# Patient Record
Sex: FEMALE | HISPANIC OR LATINO | Employment: FULL TIME | URBAN - METROPOLITAN AREA
[De-identification: names, ages, dates, MRNs, and addresses within clinical notes are randomized per-mention and may not be internally consistent; named-entity substitution may affect disease eponyms.]

---

## 2022-05-10 ENCOUNTER — OFFICE VISIT (OUTPATIENT)
Dept: PODIATRY | Facility: CLINIC | Age: 47
End: 2022-05-10
Payer: COMMERCIAL

## 2022-05-10 VITALS — WEIGHT: 165 LBS | HEIGHT: 60 IN | HEART RATE: 81 BPM | BODY MASS INDEX: 32.39 KG/M2 | OXYGEN SATURATION: 98 %

## 2022-05-10 DIAGNOSIS — L60.0 INGROWN TOENAIL: Primary | ICD-10-CM

## 2022-05-10 PROCEDURE — 11750 EXCISION NAIL&NAIL MATRIX: CPT | Mod: T5 | Performed by: PODIATRIST

## 2022-05-10 PROCEDURE — 99203 OFFICE O/P NEW LOW 30 MIN: CPT | Mod: 25 | Performed by: PODIATRIST

## 2022-05-10 RX ORDER — LIDOCAINE HYDROCHLORIDE 20 MG/ML
5 INJECTION, SOLUTION INFILTRATION; PERINEURAL ONCE
Status: COMPLETED | OUTPATIENT
Start: 2022-05-10 | End: 2022-05-10

## 2022-05-10 RX ORDER — DOXYCYCLINE 100 MG/1
100 TABLET ORAL DAILY
COMMUNITY

## 2022-05-10 RX ADMIN — LIDOCAINE HYDROCHLORIDE 5 ML: 20 INJECTION, SOLUTION INFILTRATION; PERINEURAL at 15:34

## 2022-05-10 ASSESSMENT — PAIN SCALES - GENERAL: PAINLEVEL: SEVERE PAIN (7)

## 2022-05-10 NOTE — LETTER
5/10/2022         RE: Opal Barbour  [address]  [place] [address]        Dear Colleague,    Thank you for referring your patient, Opal Barbour, to the [facility]. Please see a copy of my visit note below.    FOOT AND ANKLE SURGERY/PODIATRY CONSULT NOTE         ASSESSMENT:   Ingrown toenail right great toe      TREATMENT:  Performed partial nail excision and matrixectomy of the lateral border of the right great toenail today under local anesthesia of 2% lidocaine plain via the phenol and alcohol technique.  The patient tolerated the procedure and anesthesia well and was discharged in good condition.  She was given both written and verbal postoperative instructions.  She is to return to the clinic as needed.        HPI:Opal Barbour presented to the clinic today complaining of painful ingrown toenail involving her right great toe.  The patient indicated she has had a problem with this toenail for several weeks.  It is quite painful and aggravated by her shoe gear.  She has to stand and wear enclosed shoes while at work and this severely aggravates the toenail.  She denies any trauma to the toe.  She does have some redness and swelling and some mild drainage.  There are no factors which relieve her pain.  She denies any other previous treatment.    No past medical history on file.    Social History     Socioeconomic History     Marital status:      Spouse name: Not on file     Number of children: Not on file     Years of education: Not on file     Highest education level: Not on file   Occupational History     Not on file   Tobacco Use     Smoking status: Not on file     Smokeless tobacco: Not on file   Substance and Sexual Activity     Alcohol use: Not on file     Drug use: Not on file     Sexual activity: Not on file   Other Topics Concern     Not on file   Social History Narrative     Not on file     Social Determinants of Health      Financial Resource Strain: Not on file   Food Insecurity: Not on file   Transportation Needs: Not on file   Physical Activity: Not on file   Stress: Not on file   Social Connections: Not on file   Intimate Partner Violence: Not on file   Housing Stability: Not on file          Allergies   Allergen Reactions     Penicillins Hives        No current outpatient medications on file.     No family history on file.     Social History     Socioeconomic History     Marital status:      Spouse name: Not on file     Number of children: Not on file     Years of education: Not on file     Highest education level: Not on file   Occupational History     Not on file   Tobacco Use     Smoking status: Not on file     Smokeless tobacco: Not on file   Substance and Sexual Activity     Alcohol use: Not on file     Drug use: Not on file     Sexual activity: Not on file   Other Topics Concern     Not on file   Social History Narrative     Not on file     Social Determinants of Health     Financial Resource Strain: Not on file   Food Insecurity: Not on file   Transportation Needs: Not on file   Physical Activity: Not on file   Stress: Not on file   Social Connections: Not on file   Intimate Partner Violence: Not on file   Housing Stability: Not on file        Review of Systems - Patient denies fever, chills, rash, wound, stiffness, limping, numbness, weakness, heart burn, blood in stool, chest pain with activity, calf pain when walking, shortness of breath with activity, chronic cough, easy bleeding/bruising, swelling of ankles, excessive thirst, fatigue, depression, anxiety.  Patient admits to painful ingrown toenail right great toe.      OBJECTIVE:  Appearance: alert, well appearing, and in no distress.    There were no vitals taken for this visit.     There is no height or weight on file to calculate BMI.     General appearance: Patient is alert and fully cooperative with history & exam.  No sign of distress is noted during  the visit.  Psychiatric: Affect is pleasant & appropriate.  Patient appears motivated to improve health.  Respiratory: Breathing is regular & unlabored while sitting.  HEENT: Hearing is intact to spoken word.  Speech is clear.  No gross evidence of visual impairment that would impact ambulation.    Vascular: Dorsalis pedis and posterior tibial pulses are palpable. There is no pedal hair growth bilaterally.  CFT < 3 sec from anterior tibial surface to distal digits bilaterally. There is no appreciable edema noted.  Dermatologic: The lateral borde of the right great toenail is severely incurvated.  There is moderate edema and erythema along the lateral margin of the right great toenail.  Turgor and texture are within normal limits. No coloration or temperature changes. No primary or secondary lesions noted.  Neurologic: All epicritic and proprioceptive sensations are grossly intact bilaterally.  Musculoskeletal: All active and passive ankle, subtalar, midtarsal, and 1st MPJ range of motion are grossly intact without pain or crepitus, with the exception of none. Manual muscle strength is within normal limits bilaterally. All dorsiflexors, plantarflexors, invertors, evertors are intact bilaterally. Tenderness present to the lateral margin of the right great toenail on palpation.  No tenderness to the right great toe with range of motion. Calf is soft/non-tender without warmth/induration    Imaging:       No images are attached to the encounter or orders placed in the encounter.     No results found.   No results found.       Sanjay Cool DPM  [facility] Foot & Ankle Surgery/Podiatry         Again, thank you for allowing me to participate in the care of your patient.        Sincerely,        Sanjay Lewis DPM

## 2022-05-10 NOTE — PROGRESS NOTES
FOOT AND ANKLE SURGERY/PODIATRY CONSULT NOTE         ASSESSMENT:   Ingrown toenail right great toe      TREATMENT:  Performed partial nail excision and matrixectomy of the lateral border of the right great toenail today under local anesthesia of 2% lidocaine plain via the phenol and alcohol technique.  The patient tolerated the procedure and anesthesia well and was discharged in good condition.  She was given both written and verbal postoperative instructions.  She is to return to the clinic as needed.        HPI:Opal Barbour presented to the clinic today complaining of painful ingrown toenail involving her right great toe.  The patient indicated she has had a problem with this toenail for several weeks.  It is quite painful and aggravated by her shoe gear.  She has to stand and wear enclosed shoes while at work and this severely aggravates the toenail.  She denies any trauma to the toe.  She does have some redness and swelling and some mild drainage.  There are no factors which relieve her pain.  She denies any other previous treatment.    No past medical history on file.    Social History     Socioeconomic History     Marital status:      Spouse name: Not on file     Number of children: Not on file     Years of education: Not on file     Highest education level: Not on file   Occupational History     Not on file   Tobacco Use     Smoking status: Not on file     Smokeless tobacco: Not on file   Substance and Sexual Activity     Alcohol use: Not on file     Drug use: Not on file     Sexual activity: Not on file   Other Topics Concern     Not on file   Social History Narrative     Not on file     Social Determinants of Health     Financial Resource Strain: Not on file   Food Insecurity: Not on file   Transportation Needs: Not on file   Physical Activity: Not on file   Stress: Not on file   Social Connections: Not on file   Intimate Partner Violence: Not on file   Housing Stability: Not on file           Allergies   Allergen Reactions     Penicillins Hives        No current outpatient medications on file.     No family history on file.     Social History     Socioeconomic History     Marital status:      Spouse name: Not on file     Number of children: Not on file     Years of education: Not on file     Highest education level: Not on file   Occupational History     Not on file   Tobacco Use     Smoking status: Not on file     Smokeless tobacco: Not on file   Substance and Sexual Activity     Alcohol use: Not on file     Drug use: Not on file     Sexual activity: Not on file   Other Topics Concern     Not on file   Social History Narrative     Not on file     Social Determinants of Health     Financial Resource Strain: Not on file   Food Insecurity: Not on file   Transportation Needs: Not on file   Physical Activity: Not on file   Stress: Not on file   Social Connections: Not on file   Intimate Partner Violence: Not on file   Housing Stability: Not on file        Review of Systems - Patient denies fever, chills, rash, wound, stiffness, limping, numbness, weakness, heart burn, blood in stool, chest pain with activity, calf pain when walking, shortness of breath with activity, chronic cough, easy bleeding/bruising, swelling of ankles, excessive thirst, fatigue, depression, anxiety.  Patient admits to painful ingrown toenail right great toe.      OBJECTIVE:  Appearance: alert, well appearing, and in no distress.    There were no vitals taken for this visit.     There is no height or weight on file to calculate BMI.     General appearance: Patient is alert and fully cooperative with history & exam.  No sign of distress is noted during the visit.  Psychiatric: Affect is pleasant & appropriate.  Patient appears motivated to improve health.  Respiratory: Breathing is regular & unlabored while sitting.  HEENT: Hearing is intact to spoken word.  Speech is clear.  No gross evidence of visual impairment that would  impact ambulation.    Vascular: Dorsalis pedis and posterior tibial pulses are palpable. There is no pedal hair growth bilaterally.  CFT < 3 sec from anterior tibial surface to distal digits bilaterally. There is no appreciable edema noted.  Dermatologic: The lateral borde of the right great toenail is severely incurvated.  There is moderate edema and erythema along the lateral margin of the right great toenail.  Turgor and texture are within normal limits. No coloration or temperature changes. No primary or secondary lesions noted.  Neurologic: All epicritic and proprioceptive sensations are grossly intact bilaterally.  Musculoskeletal: All active and passive ankle, subtalar, midtarsal, and 1st MPJ range of motion are grossly intact without pain or crepitus, with the exception of none. Manual muscle strength is within normal limits bilaterally. All dorsiflexors, plantarflexors, invertors, evertors are intact bilaterally. Tenderness present to the lateral margin of the right great toenail on palpation.  No tenderness to the right great toe with range of motion. Calf is soft/non-tender without warmth/induration    Imaging:       No images are attached to the encounter or orders placed in the encounter.     No results found.   No results found.       Sanjay Lewis; SASHA  [facility] Foot & Ankle Surgery/Podiatry

## 2022-05-10 NOTE — PATIENT INSTRUCTIONS
POSTOPERATIVE INSTRUCTIONS AFTER CHEMICAL NAIL REMOVAL SURGERY    What to expect after surgery:  Your toe will be numb for around 2-8 hours after your nail procedure due to the shots that were given.  Expect some degree of soreness in your toe later today when the numbness wears off.  Rest, elevation and ice applied at the ankle will help ease the pain. Your bandage was wrapped snug to cut down on bleeding.    This may feel tight when the numbness wears off.  Please remove the bandage tomorrow morning and begin the foot soaks described below.  Warm water will feel good and helps to ease the pain  How to Care for Your Toe After Surgery  One daily foot soak will be necessary to heal properly.  Chemicals were used to kill the root of the nail.  Expect local redness, drainage and tenderness , this will last for 6-8 weeks.  Soaking helps loosen the scab and dried drainage.  Failure to soak leads to a hard scab that blocks drainage.  Back up drainage increases the pain and the scab may need to be removed with another office procedure.  You will heal much quicker and be more comfortable if you are consistent with local wound care and foot soaks.  Soak one time every day for 2 weeks.  Soaks should last 10 minutes.  Soak after taking a shower to get the germs out.  Soak in warm water with hydrogen peroxide 5 parts water to 1 part hydrogen peroxide.  A small amount of bleeding may be noted which is normal.   Clean the surgical site with a Q-tip during each soak.  Dip the Q-tip in the water and gently clean away any crusted drainage.  The area may be tender but you will not harm anything with the Q-tip.  Pat the area dry and allow a few minutes to air dry before applying any bandages.  Flexible fabric style band aids work best.  In general, the area will be wet from drainage.  A small dab of antibiotic ointment is okay but watch out for excessive moisture.  White and wrinkled skin is a sign of too much moisture and that the  skin needs to be dried out. It is ok to allow the toe some air by removing the band aid as needed.  Activity  Feel free to do normal activities as tolerated on the following day.  Wear open toe sandals if regular shoes are not comfortable.  Avoid shoes that are tight on the toe.  Medications   Finish any antibiotics if they have been prescribed.  Tylenol or ibuprofen may be used as needed for pain.  Icing and elevation also help with pain and swelling.  Risks  Watch for signs of infection.  It is normal to see redness, local tenderness, and drainage around the nail area for up to 8 weeks after permanent nail removal surgery.  Call the clinic at [phone] if you see red streaks spreading up the toe, foot, or leg.  Fever and chills are also concerning and you should notify the clinic if you are having these symptoms.  If these symptoms occur when the clinic is closed, please go to urgent care.  How Well Does Permanent Nail Surgery Work?  Permanent nail surgery means that we intend that the nail problem will not come back.  In a small percentage of patients, nail problems return in about 6 months to a year.  We would like to see you back in the office if you are experiencing problems in the future.  Please call [phone] with any additional questions.

## 2022-08-22 VITALS
HEIGHT: 62 IN | SYSTOLIC BLOOD PRESSURE: 148 MMHG | DIASTOLIC BLOOD PRESSURE: 62 MMHG | WEIGHT: 160 LBS | BODY MASS INDEX: 29.44 KG/M2 | HEART RATE: 62 BPM | OXYGEN SATURATION: 100 % | TEMPERATURE: 98.3 F

## 2022-08-22 PROCEDURE — 99283 EMERGENCY DEPT VISIT LOW MDM: CPT | Mod: 25

## 2022-08-22 PROCEDURE — 16020 DRESS/DEBRID P-THICK BURN S: CPT

## 2022-08-23 ENCOUNTER — HOSPITAL ENCOUNTER (EMERGENCY)
Facility: CLINIC | Age: 47
Discharge: HOME | End: 2022-08-23
Attending: EMERGENCY MEDICINE | Admitting: EMERGENCY MEDICINE
Payer: OTHER MISCELLANEOUS

## 2022-08-23 DIAGNOSIS — T22.219A PARTIAL THICKNESS BURN OF FOREARM, INITIAL ENCOUNTER: ICD-10-CM

## 2022-08-23 PROCEDURE — 250N000009 HC RX 250: Performed by: EMERGENCY MEDICINE

## 2022-08-23 PROCEDURE — 250N000013 HC RX MED GY IP 250 OP 250 PS 637: Performed by: EMERGENCY MEDICINE

## 2022-08-23 RX ORDER — ACETAMINOPHEN 325 MG/1
975 TABLET ORAL ONCE
Status: COMPLETED | OUTPATIENT
Start: 2022-08-23 | End: 2022-08-23

## 2022-08-23 RX ORDER — SILVER SULFADIAZINE 10 MG/G
CREAM TOPICAL
Qty: 50 G | Refills: 1 | Status: SHIPPED | OUTPATIENT
Start: 2022-08-23

## 2022-08-23 RX ORDER — IBUPROFEN 600 MG/1
600 TABLET, FILM COATED ORAL ONCE
Status: COMPLETED | OUTPATIENT
Start: 2022-08-23 | End: 2022-08-23

## 2022-08-23 RX ORDER — SILVER SULFADIAZINE 10 MG/G
1 CREAM TOPICAL ONCE
Status: COMPLETED | OUTPATIENT
Start: 2022-08-23 | End: 2022-08-23

## 2022-08-23 RX ADMIN — IBUPROFEN 600 MG: 600 TABLET ORAL at 02:50

## 2022-08-23 RX ADMIN — ACETAMINOPHEN 975 MG: 325 TABLET ORAL at 02:50

## 2022-08-23 RX ADMIN — SILVER SULFADIAZINE 1 G: 10 CREAM TOPICAL at 05:48

## 2022-08-23 ASSESSMENT — ENCOUNTER SYMPTOMS: WOUND: 1

## 2022-08-23 ASSESSMENT — ACTIVITIES OF DAILY LIVING (ADL)
ADLS_ACUITY_SCORE: 35
ADLS_ACUITY_SCORE: 35
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
## 2022-08-23 NOTE — ED NOTES
"RFA reddened and blisters noted  Silvadene applied, covered with 8\" x3\" non stick dressings and wrapped with gauze rolls   "

## 2023-11-28 NOTE — LETTER
August 23, 2022      To Whom It May Concern:      Opal Barbour was seen in our Emergency Department today, 08/23/22.  I expect her condition to improve over the next 3 days.  She may return to work/school when improved.    Sincerely,        Hoang Nicholson MD        
Never smoker

## 2024-08-20 ENCOUNTER — TRANSCRIBE ORDERS (OUTPATIENT)
Facility: CLINIC | Age: 49
End: 2024-08-20
Payer: COMMERCIAL

## 2024-08-20 DIAGNOSIS — M54.2 NECK PAIN: ICD-10-CM

## 2024-08-20 DIAGNOSIS — M75.100 ROTATOR CUFF TEAR: Primary | ICD-10-CM

## 2024-08-20 DIAGNOSIS — M54.31 SCIATICA, RIGHT SIDE: ICD-10-CM

## 2024-08-29 ENCOUNTER — APPOINTMENT (OUTPATIENT)
Dept: INTERPRETER SERVICES | Facility: CLINIC | Age: 49
End: 2024-08-29
Payer: COMMERCIAL

## 2024-11-29 ENCOUNTER — ANCILLARY PROCEDURE (OUTPATIENT)
Dept: MAMMOGRAPHY | Facility: CLINIC | Age: 49
End: 2024-11-29
Attending: ORTHOPAEDIC SURGERY
Payer: COMMERCIAL

## 2024-11-29 DIAGNOSIS — Z12.31 VISIT FOR SCREENING MAMMOGRAM: ICD-10-CM

## 2024-11-29 PROCEDURE — 77063 BREAST TOMOSYNTHESIS BI: CPT | Mod: TC | Performed by: RADIOLOGY

## 2024-11-29 PROCEDURE — 77067 SCR MAMMO BI INCL CAD: CPT | Mod: TC | Performed by: RADIOLOGY

## 2024-12-14 ENCOUNTER — HEALTH MAINTENANCE LETTER (OUTPATIENT)
Age: 49
End: 2024-12-14

## 2024-12-27 ENCOUNTER — THERAPY VISIT (OUTPATIENT)
Dept: PHYSICAL THERAPY | Facility: CLINIC | Age: 49
End: 2024-12-27
Attending: ORTHOPAEDIC SURGERY
Payer: COMMERCIAL

## 2024-12-27 DIAGNOSIS — G89.29 CHRONIC RIGHT SHOULDER PAIN: Primary | ICD-10-CM

## 2024-12-27 DIAGNOSIS — M54.2 NECK PAIN ON RIGHT SIDE: ICD-10-CM

## 2024-12-27 DIAGNOSIS — M25.511 CHRONIC RIGHT SHOULDER PAIN: Primary | ICD-10-CM

## 2024-12-27 PROCEDURE — 97161 PT EVAL LOW COMPLEX 20 MIN: CPT | Mod: GP

## 2024-12-27 PROCEDURE — 97110 THERAPEUTIC EXERCISES: CPT | Mod: GP

## 2024-12-27 ASSESSMENT — ACTIVITIES OF DAILY LIVING (ADL)
WHEN_LYING_ON_THE_INVOLVED_SIDE: 8
TOUCHING_THE_BACK_OF_YOUR_NECK: 5
WASHING_YOUR_BACK: 8
PUTTING_ON_YOUR_PANTS: 6
WASHING_YOUR_HAIR?: 6
REACHING_FOR_SOMETHING_ON_A_HIGH_SHELF: 10
PUSHING_WITH_THE_INVOLVED_ARM: 6
REMOVING_SOMETHING_FROM_YOUR_BACK_POCKET: 6
PUTTING_ON_AN_UNDERSHIRT_OR_A_PULLOVER_SWEATER: 8
AT_ITS_WORST?: 10
PLACING_AN_OBJECT_ON_A_HIGH_SHELF: 9
PUTTING_ON_A_SHIRT_THAT_BUTTONS_DOWN_THE_FRONT: 6
CARRYING_A_HEAVY_OBJECT_OF_10_POUNDS: 9
PLEASE_INDICATE_YOR_PRIMARY_REASON_FOR_REFERRAL_TO_THERAPY:: SHOULDER

## 2024-12-27 NOTE — PROGRESS NOTES
PHYSICAL THERAPY EVALUATION  Type of Visit: Evaluation              Subjective     Opal reports on and off R shoulder pain ever since 2015. She got better for a while, then shoulder recently worsened. Notes swelling in R hand that has been getting better w/ acupuncture. 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 Has seen chiro and acupuncture but not PT so far.         Presenting condition or subjective complaint: (Patient-Rptd) Right Shoulder pain  Date of onset:      Relevant medical history:     Dates & types of surgery:      Prior diagnostic imaging/testing results: (Patient-Rptd) X-ray     Prior therapy history for the same diagnosis, illness or injury: (Patient-Rptd) No      Prior Level of Function  Transfers:   Ambulation:   ADL:   IADL:     Living Environment  Social support: (Patient-Rptd) With family members   Type of home: (Patient-Rptd) Apartment/condo   Stairs to enter the home: (Patient-Rptd) Yes (Patient-Rptd) 10 Is there a railing: (Patient-Rptd) Yes     Ramp: (Patient-Rptd) No   Stairs inside the home: (Patient-Rptd) Yes (Patient-Rptd) 10 Is there a railing: (Patient-Rptd) Yes     Help at home: (Patient-Rptd) Self Cares (home health aide/personal care attendant, family, etc)  Equipment owned:       Employment: (Patient-Rptd) Yes (Patient-Rptd)   Hobbies/Interests:      Patient goals for therapy: (Patient-Rptd) Not have pain    Pain assessment:      Objective   SHOULDER EVALUATION  PAIN: Pain Level at Rest: 5/10  Pain Level with Use: 9/10  Pain Location: Superior and posterior R shoulder, R neck  Pain Quality: Aching, Dull, and Sharp  Pain is Exacerbated By: use, lifting  Pain is Relieved By: cold, heat, and rest  INTEGUMENTARY (edema, incisions):  slight edema in dorsal R hand and 2 and 3rd digits  POSTURE: Sitting Posture: Rounded shoulders  ROM: *=pain  (Degrees) Left AROM Left PROM Right AROM  Right  PROM   Shoulder Flexion 140  110*    Shoulder Extension       Shoulder Abduction 160  135*    Shoulder Adduction       Shoulder Internal Rotation IR HBB T9  IR HBB L1*    Shoulder External Rotation 65  65      STRENGTH:   Pain: - none + mild ++ moderate +++ severe  Strength Scale: 0-5/5 Left Right   Shoulder Flexion 5 4   Shoulder Extension     Shoulder Abduction 5 4   Shoulder Adduction     Shoulder Internal Rotation 4+ 3+, pain   Shoulder External Rotation 4+ 3+, pain   Shoulder Horizontal Abduction     Shoulder Horizontal Adduction     Elbow Flexion 5 3+   FLEXIBILITY: Decreased upper trap L, Decreased levator L, Decreased upper trap R, Decreased levator R  SPECIAL TESTS:   PALPATION:  R upper trap, levator scap, and cervical extensors are tight and TTP. Palpation limited by pain today  JOINT MOBILITY:   CERVICAL SCREEN:  Moderate loss in all directions and posterior neck/R shoulder pain    Assessment & Plan   CLINICAL IMPRESSIONS  Medical Diagnosis: Rotator cuff tear  Neck pain  Sciatica, right side    Treatment Diagnosis: R shoulder and neck pain   Impression/Assessment: Patient is a 49 year old female with R shoulder and neck pain complaints.  The following significant findings have been identified: Pain, Decreased ROM/flexibility, Decreased strength, Impaired muscle performance, Decreased activity tolerance, and Impaired posture. These impairments interfere with their ability to perform self care tasks, work tasks, recreational activities, household chores, driving , household mobility, and community mobility as compared to previous level of function.     Clinical Decision Making (Complexity):  Clinical Presentation: Stable/Uncomplicated  Clinical Presentation Rationale: based on medical and personal factors listed in PT evaluation  Clinical Decision Making (Complexity): Low complexity    PLAN OF CARE  Treatment Interventions:  Modalities: Cryotherapy, Dry Needling, E-stim, Hot Pack  Interventions: Manual  Therapy, Neuromuscular Re-education, Therapeutic Activity, Therapeutic Exercise, Self-Care/Home Management    Long Term Goals     PT Goal 1  Goal Identifier: Reaching  Goal Description: Pt will report 0/10 w/ AROM of R arm and lifting up to 10 lbs  Rationale: to maximize safety and independence with performance of ADLs and functional tasks;to maximize safety and independence within the home;to maximize safety and independence within the community;to maximize safety and independence with transportation;to maximize safety and independence with self cares (for painfree participation in work)  Target Date: 03/26/25      Frequency of Treatment: every-other wk  Duration of Treatment: 3 mo    Recommended Referrals to Other Professionals:   Education Assessment:   Learner/Method: Patient;Listening;Demonstration;Pictures/Video;No Barriers to Learning    Risks and benefits of evaluation/treatment have been explained.   Patient/Family/caregiver agrees with Plan of Care.     Evaluation Time:     PT Eval, Low Complexity Minutes (27552): 15       Signing Clinician: PAIGE COPE PT

## 2024-12-29 PROBLEM — G89.29 CHRONIC RIGHT SHOULDER PAIN: Status: ACTIVE | Noted: 2024-12-29

## 2024-12-29 PROBLEM — M25.511 CHRONIC RIGHT SHOULDER PAIN: Status: ACTIVE | Noted: 2024-12-29

## 2024-12-29 PROBLEM — M54.2 NECK PAIN ON RIGHT SIDE: Status: ACTIVE | Noted: 2024-12-29
